# Patient Record
Sex: FEMALE | Race: WHITE | Employment: UNEMPLOYED | URBAN - METROPOLITAN AREA
[De-identification: names, ages, dates, MRNs, and addresses within clinical notes are randomized per-mention and may not be internally consistent; named-entity substitution may affect disease eponyms.]

---

## 2022-10-25 LAB
ABO, EXTERNAL RESULT: NORMAL
HEP B, EXTERNAL RESULT: NORMAL
HEPATITIS C ANTIBODY, EXTERNAL RESULT: NORMAL
HIV, EXTERNAL RESULT: NORMAL
RH FACTOR, EXTERNAL RESULT: NORMAL
RUBELLA TITER, EXTERNAL RESULT: NORMAL

## 2023-04-27 LAB
C. TRACHOMATIS, EXTERNAL RESULT: NORMAL
N. GONORRHOEAE, EXTERNAL RESULT: NORMAL

## 2023-05-02 LAB — GBS, EXTERNAL RESULT: NORMAL

## 2023-05-23 ENCOUNTER — HOSPITAL ENCOUNTER (INPATIENT)
Facility: HOSPITAL | Age: 33
LOS: 4 days | Discharge: HOME | DRG: 540 | End: 2023-05-27
Attending: SPECIALIST | Admitting: MIDWIFE
Payer: MEDICAID

## 2023-05-23 DIAGNOSIS — G89.18 POST-OP PAIN: Primary | ICD-10-CM

## 2023-05-23 PROBLEM — Z34.80 PREGNANCY IN MULTIGRAVIDA: Status: ACTIVE | Noted: 2023-05-23

## 2023-05-23 LAB
AMPHET UR QL SCN: NEGATIVE
BARBITURATES UR QL SCN: NEGATIVE
BASOPHILS # BLD: 0 K/UL (ref 0–0.1)
BASOPHILS NFR BLD: 0 % (ref 0–1)
BENZODIAZ UR QL: NEGATIVE
CANNABINOIDS UR QL SCN: NEGATIVE
COCAINE UR QL SCN: NEGATIVE
DIFFERENTIAL METHOD BLD: NORMAL
EOSINOPHIL # BLD: 0.1 K/UL (ref 0–0.4)
EOSINOPHIL NFR BLD: 1 % (ref 0–7)
ERYTHROCYTE [DISTWIDTH] IN BLOOD BY AUTOMATED COUNT: 13.7 % (ref 11.5–14.5)
HCT VFR BLD AUTO: 36.7 % (ref 35–47)
HGB BLD-MCNC: 12.3 G/DL (ref 11.5–16)
IMM GRANULOCYTES # BLD AUTO: 0 K/UL (ref 0–0.04)
IMM GRANULOCYTES NFR BLD AUTO: 0 % (ref 0–0.5)
LYMPHOCYTES # BLD: 1.5 K/UL (ref 0.8–3.5)
LYMPHOCYTES NFR BLD: 17 % (ref 12–49)
Lab: NORMAL
MCH RBC QN AUTO: 30.9 PG (ref 26–34)
MCHC RBC AUTO-ENTMCNC: 33.5 G/DL (ref 30–36.5)
MCV RBC AUTO: 92.2 FL (ref 80–99)
METHADONE UR QL: NEGATIVE
MONOCYTES # BLD: 0.7 K/UL (ref 0–1)
MONOCYTES NFR BLD: 8 % (ref 5–13)
NEUTS SEG # BLD: 6.6 K/UL (ref 1.8–8)
NEUTS SEG NFR BLD: 74 % (ref 32–75)
NRBC # BLD: 0 K/UL (ref 0–0.01)
NRBC BLD-RTO: 0 PER 100 WBC
OPIATES UR QL: NEGATIVE
PCP UR QL: NEGATIVE
PLATELET # BLD AUTO: 187 K/UL (ref 150–400)
PMV BLD AUTO: 12.6 FL (ref 8.9–12.9)
RBC # BLD AUTO: 3.98 M/UL (ref 3.8–5.2)
WBC # BLD AUTO: 9 K/UL (ref 3.6–11)

## 2023-05-23 PROCEDURE — 1120000000 HC RM PRIVATE OB

## 2023-05-23 PROCEDURE — 6370000000 HC RX 637 (ALT 250 FOR IP): Performed by: MIDWIFE

## 2023-05-23 PROCEDURE — 7210000100 HC LABOR FEE PER 1 HR

## 2023-05-23 PROCEDURE — 86850 RBC ANTIBODY SCREEN: CPT

## 2023-05-23 PROCEDURE — 86900 BLOOD TYPING SEROLOGIC ABO: CPT

## 2023-05-23 PROCEDURE — 86901 BLOOD TYPING SEROLOGIC RH(D): CPT

## 2023-05-23 PROCEDURE — 80307 DRUG TEST PRSMV CHEM ANLYZR: CPT

## 2023-05-23 PROCEDURE — 85025 COMPLETE CBC W/AUTO DIFF WBC: CPT

## 2023-05-23 PROCEDURE — 36415 COLL VENOUS BLD VENIPUNCTURE: CPT

## 2023-05-23 PROCEDURE — 59200 INSERT CERVICAL DILATOR: CPT

## 2023-05-23 RX ORDER — SODIUM CHLORIDE, SODIUM LACTATE, POTASSIUM CHLORIDE, CALCIUM CHLORIDE 600; 310; 30; 20 MG/100ML; MG/100ML; MG/100ML; MG/100ML
INJECTION, SOLUTION INTRAVENOUS CONTINUOUS
Status: DISCONTINUED | OUTPATIENT
Start: 2023-05-24 | End: 2023-05-26

## 2023-05-23 RX ORDER — ONDANSETRON 2 MG/ML
4 INJECTION INTRAMUSCULAR; INTRAVENOUS EVERY 6 HOURS PRN
Status: DISCONTINUED | OUTPATIENT
Start: 2023-05-23 | End: 2023-05-26

## 2023-05-23 RX ORDER — NALBUPHINE HCL 10 MG/ML
10 AMPUL (ML) INJECTION
Status: DISCONTINUED | OUTPATIENT
Start: 2023-05-23 | End: 2023-05-26

## 2023-05-23 RX ORDER — SODIUM CHLORIDE 0.9 % (FLUSH) 0.9 %
5-40 SYRINGE (ML) INJECTION EVERY 12 HOURS SCHEDULED
Status: DISCONTINUED | OUTPATIENT
Start: 2023-05-23 | End: 2023-05-26

## 2023-05-23 RX ORDER — MINERAL OIL 471.99 G/472ML
2.5 OIL TOPICAL
Status: DISPENSED | OUTPATIENT
Start: 2023-05-24 | End: 2023-05-25

## 2023-05-23 RX ORDER — TERBUTALINE SULFATE 1 MG/ML
0.25 INJECTION, SOLUTION SUBCUTANEOUS
Status: ACTIVE | OUTPATIENT
Start: 2023-05-23 | End: 2023-05-24

## 2023-05-23 RX ORDER — SODIUM CHLORIDE 9 MG/ML
25 INJECTION, SOLUTION INTRAVENOUS PRN
Status: DISCONTINUED | OUTPATIENT
Start: 2023-05-23 | End: 2023-05-26

## 2023-05-23 RX ORDER — SODIUM CHLORIDE, SODIUM LACTATE, POTASSIUM CHLORIDE, AND CALCIUM CHLORIDE .6; .31; .03; .02 G/100ML; G/100ML; G/100ML; G/100ML
1000 INJECTION, SOLUTION INTRAVENOUS PRN
Status: DISCONTINUED | OUTPATIENT
Start: 2023-05-23 | End: 2023-05-26

## 2023-05-23 RX ORDER — ZOLPIDEM TARTRATE 5 MG/1
5 TABLET ORAL NIGHTLY PRN
Status: DISCONTINUED | OUTPATIENT
Start: 2023-05-23 | End: 2023-05-27 | Stop reason: HOSPADM

## 2023-05-23 RX ORDER — ACETAMINOPHEN 325 MG/1
650 TABLET ORAL EVERY 4 HOURS PRN
Status: DISCONTINUED | OUTPATIENT
Start: 2023-05-23 | End: 2023-05-27 | Stop reason: HOSPADM

## 2023-05-23 RX ORDER — SODIUM CHLORIDE, SODIUM LACTATE, POTASSIUM CHLORIDE, AND CALCIUM CHLORIDE .6; .31; .03; .02 G/100ML; G/100ML; G/100ML; G/100ML
500 INJECTION, SOLUTION INTRAVENOUS PRN
Status: DISCONTINUED | OUTPATIENT
Start: 2023-05-23 | End: 2023-05-26

## 2023-05-23 RX ORDER — SODIUM CHLORIDE 0.9 % (FLUSH) 0.9 %
5-40 SYRINGE (ML) INJECTION PRN
Status: DISCONTINUED | OUTPATIENT
Start: 2023-05-23 | End: 2023-05-26

## 2023-05-23 RX ADMIN — ACETAMINOPHEN 650 MG: 325 TABLET ORAL at 19:55

## 2023-05-23 ASSESSMENT — PAIN DESCRIPTION - DESCRIPTORS: DESCRIPTORS: ACHING

## 2023-05-23 ASSESSMENT — PAIN SCALES - GENERAL: PAINLEVEL_OUTOF10: 3

## 2023-05-23 ASSESSMENT — PAIN DESCRIPTION - LOCATION: LOCATION: ABDOMEN

## 2023-05-23 NOTE — PROGRESS NOTES
1720- SVE completed with reyes balloon with 60 ml to vaginal and uterine balloon, orders received that pt could have NST at this time and then to have FHR tones in evening shift and or PRN and then to begin pitocin at 0600 in am by SALLY Guillen. 1930- SBAR to SALLY Obregon RN

## 2023-05-24 ENCOUNTER — ANESTHESIA EVENT (OUTPATIENT)
Dept: LABOR AND DELIVERY | Facility: HOSPITAL | Age: 33
End: 2023-05-24
Payer: MEDICAID

## 2023-05-24 ENCOUNTER — ANESTHESIA (OUTPATIENT)
Dept: LABOR AND DELIVERY | Facility: HOSPITAL | Age: 33
End: 2023-05-24
Payer: MEDICAID

## 2023-05-24 PROCEDURE — 2500000003 HC RX 250 WO HCPCS

## 2023-05-24 PROCEDURE — 2500000003 HC RX 250 WO HCPCS: Performed by: ANESTHESIOLOGY

## 2023-05-24 PROCEDURE — 1120000000 HC RM PRIVATE OB

## 2023-05-24 PROCEDURE — 10907ZC DRAINAGE OF AMNIOTIC FLUID, THERAPEUTIC FROM PRODUCTS OF CONCEPTION, VIA NATURAL OR ARTIFICIAL OPENING: ICD-10-PCS | Performed by: SPECIALIST

## 2023-05-24 PROCEDURE — 6360000002 HC RX W HCPCS: Performed by: ANESTHESIOLOGY

## 2023-05-24 PROCEDURE — 10H07YZ INSERTION OF OTHER DEVICE INTO PRODUCTS OF CONCEPTION, VIA NATURAL OR ARTIFICIAL OPENING: ICD-10-PCS | Performed by: SPECIALIST

## 2023-05-24 PROCEDURE — 2500000003 HC RX 250 WO HCPCS: Performed by: NURSE ANESTHETIST, CERTIFIED REGISTERED

## 2023-05-24 PROCEDURE — 3700000000 HC ANESTHESIA ATTENDED CARE: Performed by: SPECIALIST

## 2023-05-24 PROCEDURE — 6360000002 HC RX W HCPCS: Performed by: MIDWIFE

## 2023-05-24 PROCEDURE — 3609079900 HC CESAREAN SECTION: Performed by: SPECIALIST

## 2023-05-24 PROCEDURE — 3700000001 HC ADD 15 MINUTES (ANESTHESIA): Performed by: SPECIALIST

## 2023-05-24 PROCEDURE — 2580000003 HC RX 258: Performed by: MIDWIFE

## 2023-05-24 PROCEDURE — 6360000002 HC RX W HCPCS: Performed by: NURSE ANESTHETIST, CERTIFIED REGISTERED

## 2023-05-24 PROCEDURE — 7210000100 HC LABOR FEE PER 1 HR

## 2023-05-24 PROCEDURE — 2709999900 HC NON-CHARGEABLE SUPPLY: Performed by: SPECIALIST

## 2023-05-24 PROCEDURE — 7100000001 HC PACU RECOVERY - ADDTL 15 MIN

## 2023-05-24 PROCEDURE — 2500000003 HC RX 250 WO HCPCS: Performed by: OBSTETRICS & GYNECOLOGY

## 2023-05-24 PROCEDURE — 00HU33Z INSERTION OF INFUSION DEVICE INTO SPINAL CANAL, PERCUTANEOUS APPROACH: ICD-10-PCS | Performed by: SPECIALIST

## 2023-05-24 PROCEDURE — 2580000003 HC RX 258: Performed by: NURSE ANESTHETIST, CERTIFIED REGISTERED

## 2023-05-24 PROCEDURE — 3700000156 HC EPIDURAL ANESTHESIA

## 2023-05-24 PROCEDURE — 2580000003 HC RX 258: Performed by: OBSTETRICS & GYNECOLOGY

## 2023-05-24 RX ORDER — FENTANYL 0.2 MG/100ML-BUPIV 0.125%-NACL 0.9% EPIDURAL INJ 2/0.125 MCG/ML-%
SOLUTION INJECTION
Status: COMPLETED
Start: 2023-05-24 | End: 2023-05-24

## 2023-05-24 RX ORDER — ONDANSETRON 2 MG/ML
INJECTION INTRAMUSCULAR; INTRAVENOUS PRN
Status: DISCONTINUED | OUTPATIENT
Start: 2023-05-24 | End: 2023-05-24 | Stop reason: SDUPTHER

## 2023-05-24 RX ORDER — NALOXONE HYDROCHLORIDE 0.4 MG/ML
INJECTION, SOLUTION INTRAMUSCULAR; INTRAVENOUS; SUBCUTANEOUS PRN
Status: DISCONTINUED | OUTPATIENT
Start: 2023-05-24 | End: 2023-05-26

## 2023-05-24 RX ORDER — SODIUM CHLORIDE 9 MG/ML
INJECTION, SOLUTION INTRAVENOUS PRN
Status: DISCONTINUED | OUTPATIENT
Start: 2023-05-24 | End: 2023-05-27 | Stop reason: HOSPADM

## 2023-05-24 RX ORDER — SODIUM CHLORIDE 0.9 % (FLUSH) 0.9 %
5-40 SYRINGE (ML) INJECTION PRN
Status: DISCONTINUED | OUTPATIENT
Start: 2023-05-24 | End: 2023-05-27 | Stop reason: HOSPADM

## 2023-05-24 RX ORDER — LIDOCAINE HYDROCHLORIDE AND EPINEPHRINE 20; 5 MG/ML; UG/ML
INJECTION, SOLUTION EPIDURAL; INFILTRATION; INTRACAUDAL; PERINEURAL PRN
Status: DISCONTINUED | OUTPATIENT
Start: 2023-05-24 | End: 2023-05-24 | Stop reason: SDUPTHER

## 2023-05-24 RX ORDER — KETOROLAC TROMETHAMINE 30 MG/ML
INJECTION, SOLUTION INTRAMUSCULAR; INTRAVENOUS PRN
Status: DISCONTINUED | OUTPATIENT
Start: 2023-05-24 | End: 2023-05-24 | Stop reason: SDUPTHER

## 2023-05-24 RX ORDER — DEXAMETHASONE SODIUM PHOSPHATE 4 MG/ML
INJECTION, SOLUTION INTRA-ARTICULAR; INTRALESIONAL; INTRAMUSCULAR; INTRAVENOUS; SOFT TISSUE PRN
Status: DISCONTINUED | OUTPATIENT
Start: 2023-05-24 | End: 2023-05-24 | Stop reason: SDUPTHER

## 2023-05-24 RX ORDER — FENTANYL CITRATE 50 UG/ML
INJECTION, SOLUTION INTRAMUSCULAR; INTRAVENOUS PRN
Status: DISCONTINUED | OUTPATIENT
Start: 2023-05-24 | End: 2023-05-24 | Stop reason: SDUPTHER

## 2023-05-24 RX ORDER — FENTANYL CITRATE 50 UG/ML
INJECTION, SOLUTION INTRAMUSCULAR; INTRAVENOUS
Status: DISPENSED
Start: 2023-05-24 | End: 2023-05-24

## 2023-05-24 RX ORDER — FENTANYL 0.2 MG/100ML-BUPIV 0.125%-NACL 0.9% EPIDURAL INJ 2/0.125 MCG/ML-%
12 SOLUTION INJECTION CONTINUOUS
Status: DISCONTINUED | OUTPATIENT
Start: 2023-05-24 | End: 2023-05-26

## 2023-05-24 RX ORDER — BUPIVACAINE HYDROCHLORIDE AND EPINEPHRINE 2.5; 5 MG/ML; UG/ML
INJECTION, SOLUTION EPIDURAL; INFILTRATION; INTRACAUDAL; PERINEURAL
Status: COMPLETED
Start: 2023-05-24 | End: 2023-05-24

## 2023-05-24 RX ORDER — DIPHENHYDRAMINE HYDROCHLORIDE 50 MG/ML
50 INJECTION INTRAMUSCULAR; INTRAVENOUS
Status: COMPLETED | OUTPATIENT
Start: 2023-05-24 | End: 2023-05-24

## 2023-05-24 RX ORDER — BUPIVACAINE HYDROCHLORIDE AND EPINEPHRINE 2.5; 5 MG/ML; UG/ML
INJECTION, SOLUTION EPIDURAL; INFILTRATION; INTRACAUDAL; PERINEURAL PRN
Status: DISCONTINUED | OUTPATIENT
Start: 2023-05-24 | End: 2023-05-24 | Stop reason: SDUPTHER

## 2023-05-24 RX ORDER — MORPHINE SULFATE 1 MG/ML
INJECTION, SOLUTION EPIDURAL; INTRATHECAL; INTRAVENOUS PRN
Status: DISCONTINUED | OUTPATIENT
Start: 2023-05-24 | End: 2023-05-24 | Stop reason: SDUPTHER

## 2023-05-24 RX ORDER — BUPIVACAINE HYDROCHLORIDE AND EPINEPHRINE 5; 5 MG/ML; UG/ML
INJECTION, SOLUTION EPIDURAL; INTRACAUDAL; PERINEURAL
Status: DISPENSED
Start: 2023-05-24 | End: 2023-05-24

## 2023-05-24 RX ORDER — ONDANSETRON 2 MG/ML
4 INJECTION INTRAMUSCULAR; INTRAVENOUS EVERY 6 HOURS PRN
Status: DISCONTINUED | OUTPATIENT
Start: 2023-05-24 | End: 2023-05-24

## 2023-05-24 RX ORDER — SODIUM CHLORIDE, SODIUM LACTATE, POTASSIUM CHLORIDE, CALCIUM CHLORIDE 600; 310; 30; 20 MG/100ML; MG/100ML; MG/100ML; MG/100ML
INJECTION, SOLUTION INTRAVENOUS CONTINUOUS
Status: DISCONTINUED | OUTPATIENT
Start: 2023-05-24 | End: 2023-05-27 | Stop reason: HOSPADM

## 2023-05-24 RX ORDER — LANOLIN/MINERAL OIL
LOTION (ML) TOPICAL
Status: DISCONTINUED | OUTPATIENT
Start: 2023-05-24 | End: 2023-05-27 | Stop reason: HOSPADM

## 2023-05-24 RX ORDER — IBUPROFEN 600 MG/1
600 TABLET ORAL EVERY 8 HOURS SCHEDULED
Status: DISCONTINUED | OUTPATIENT
Start: 2023-05-24 | End: 2023-05-27 | Stop reason: HOSPADM

## 2023-05-24 RX ORDER — SODIUM CHLORIDE, SODIUM LACTATE, POTASSIUM CHLORIDE, CALCIUM CHLORIDE 600; 310; 30; 20 MG/100ML; MG/100ML; MG/100ML; MG/100ML
INJECTION, SOLUTION INTRAVENOUS CONTINUOUS PRN
Status: DISCONTINUED | OUTPATIENT
Start: 2023-05-24 | End: 2023-05-24 | Stop reason: SDUPTHER

## 2023-05-24 RX ORDER — DOCUSATE SODIUM 100 MG/1
100 CAPSULE, LIQUID FILLED ORAL 2 TIMES DAILY
Status: DISCONTINUED | OUTPATIENT
Start: 2023-05-24 | End: 2023-05-27 | Stop reason: HOSPADM

## 2023-05-24 RX ORDER — FENTANYL 0.2 MG/100ML-BUPIV 0.125%-NACL 0.9% EPIDURAL INJ 2/0.125 MCG/ML-%
12 SOLUTION INJECTION CONTINUOUS
Status: DISCONTINUED | OUTPATIENT
Start: 2023-05-24 | End: 2023-05-24

## 2023-05-24 RX ORDER — ONDANSETRON 2 MG/ML
4 INJECTION INTRAMUSCULAR; INTRAVENOUS EVERY 6 HOURS PRN
Status: DISCONTINUED | OUTPATIENT
Start: 2023-05-24 | End: 2023-05-26

## 2023-05-24 RX ORDER — OXYCODONE HYDROCHLORIDE 5 MG/1
5 TABLET ORAL EVERY 4 HOURS PRN
Status: DISCONTINUED | OUTPATIENT
Start: 2023-05-24 | End: 2023-05-27 | Stop reason: HOSPADM

## 2023-05-24 RX ORDER — PHENYLEPHRINE HCL IN 0.9% NACL 0.4MG/10ML
SYRINGE (ML) INTRAVENOUS PRN
Status: DISCONTINUED | OUTPATIENT
Start: 2023-05-24 | End: 2023-05-24 | Stop reason: SDUPTHER

## 2023-05-24 RX ORDER — NALOXONE HYDROCHLORIDE 0.4 MG/ML
INJECTION, SOLUTION INTRAMUSCULAR; INTRAVENOUS; SUBCUTANEOUS PRN
Status: DISCONTINUED | OUTPATIENT
Start: 2023-05-24 | End: 2023-05-24

## 2023-05-24 RX ORDER — KETOROLAC TROMETHAMINE 30 MG/ML
30 INJECTION, SOLUTION INTRAMUSCULAR; INTRAVENOUS EVERY 6 HOURS
Status: DISPENSED | OUTPATIENT
Start: 2023-05-24 | End: 2023-05-25

## 2023-05-24 RX ORDER — SODIUM CHLORIDE 0.9 % (FLUSH) 0.9 %
5-40 SYRINGE (ML) INJECTION EVERY 12 HOURS SCHEDULED
Status: DISCONTINUED | OUTPATIENT
Start: 2023-05-24 | End: 2023-05-27 | Stop reason: HOSPADM

## 2023-05-24 RX ADMIN — Medication 12 ML/HR: at 12:28

## 2023-05-24 RX ADMIN — Medication 12 ML/HR: at 19:09

## 2023-05-24 RX ADMIN — ONDANSETRON 4 MG: 2 INJECTION INTRAMUSCULAR; INTRAVENOUS at 16:39

## 2023-05-24 RX ADMIN — LIDOCAINE HYDROCHLORIDE,EPINEPHRINE BITARTRATE 5 ML: 20; .005 INJECTION, SOLUTION EPIDURAL; INFILTRATION; INTRACAUDAL; PERINEURAL at 20:50

## 2023-05-24 RX ADMIN — DEXAMETHASONE SODIUM PHOSPHATE 4 MG: 4 INJECTION, SOLUTION INTRAMUSCULAR; INTRAVENOUS at 20:57

## 2023-05-24 RX ADMIN — BUPIVACAINE HYDROCHLORIDE AND EPINEPHRINE 3 ML: 2.5; 5 INJECTION, SOLUTION EPIDURAL; INFILTRATION; INTRACAUDAL; PERINEURAL at 11:51

## 2023-05-24 RX ADMIN — MORPHINE SULFATE 3 MG: 1 INJECTION, SOLUTION EPIDURAL; INTRATHECAL; INTRAVENOUS at 21:10

## 2023-05-24 RX ADMIN — OXYTOCIN 1 MILLI-UNITS/MIN: 10 INJECTION INTRAVENOUS at 08:20

## 2023-05-24 RX ADMIN — Medication 166.7 ML: at 21:49

## 2023-05-24 RX ADMIN — AZITHROMYCIN MONOHYDRATE 500 MG: 500 INJECTION, POWDER, LYOPHILIZED, FOR SOLUTION INTRAVENOUS at 20:32

## 2023-05-24 RX ADMIN — SODIUM CHLORIDE, POTASSIUM CHLORIDE, SODIUM LACTATE AND CALCIUM CHLORIDE 500 ML: 600; 310; 30; 20 INJECTION, SOLUTION INTRAVENOUS at 21:49

## 2023-05-24 RX ADMIN — Medication 80 MCG: at 21:09

## 2023-05-24 RX ADMIN — ONDANSETRON HYDROCHLORIDE 4 MG: 2 INJECTION, SOLUTION INTRAMUSCULAR; INTRAVENOUS at 20:54

## 2023-05-24 RX ADMIN — SODIUM CHLORIDE, POTASSIUM CHLORIDE, SODIUM LACTATE AND CALCIUM CHLORIDE: 600; 310; 30; 20 INJECTION, SOLUTION INTRAVENOUS at 08:55

## 2023-05-24 RX ADMIN — Medication 80 MCG: at 20:59

## 2023-05-24 RX ADMIN — FENTANYL CITRATE 100 MCG: 50 INJECTION, SOLUTION INTRAMUSCULAR; INTRAVENOUS at 11:52

## 2023-05-24 RX ADMIN — LIDOCAINE HYDROCHLORIDE,EPINEPHRINE BITARTRATE 5 ML: 20; .005 INJECTION, SOLUTION EPIDURAL; INFILTRATION; INTRACAUDAL; PERINEURAL at 20:47

## 2023-05-24 RX ADMIN — KETOROLAC TROMETHAMINE 15 MG: 30 INJECTION, SOLUTION INTRAMUSCULAR; INTRAVENOUS at 21:15

## 2023-05-24 RX ADMIN — BUPIVACAINE HYDROCHLORIDE AND EPINEPHRINE 3 ML: 2.5; 5 INJECTION, SOLUTION EPIDURAL; INFILTRATION; INTRACAUDAL; PERINEURAL at 11:52

## 2023-05-24 RX ADMIN — DIPHENHYDRAMINE HYDROCHLORIDE 50 MG: 50 INJECTION, SOLUTION INTRAMUSCULAR; INTRAVENOUS at 20:01

## 2023-05-24 RX ADMIN — Medication 120 MCG: at 21:02

## 2023-05-24 RX ADMIN — LIDOCAINE HYDROCHLORIDE,EPINEPHRINE BITARTRATE 2 ML: 20; .005 INJECTION, SOLUTION EPIDURAL; INFILTRATION; INTRACAUDAL; PERINEURAL at 21:10

## 2023-05-24 RX ADMIN — CEFAZOLIN 2000 MG: 1 INJECTION, POWDER, FOR SOLUTION INTRAMUSCULAR; INTRAVENOUS at 20:31

## 2023-05-24 RX ADMIN — BUPIVACAINE HYDROCHLORIDE AND EPINEPHRINE 0.5 ML: 2.5; 5 INJECTION, SOLUTION EPIDURAL; INFILTRATION; INTRACAUDAL; PERINEURAL at 11:50

## 2023-05-24 RX ADMIN — SODIUM CHLORIDE, POTASSIUM CHLORIDE, SODIUM LACTATE AND CALCIUM CHLORIDE: 600; 310; 30; 20 INJECTION, SOLUTION INTRAVENOUS at 20:42

## 2023-05-24 RX ADMIN — TRANEXAMIC ACID 1000 MG: 100 INJECTION, SOLUTION INTRAVENOUS at 22:03

## 2023-05-24 RX ADMIN — LIDOCAINE HYDROCHLORIDE,EPINEPHRINE BITARTRATE 5 ML: 20; .005 INJECTION, SOLUTION EPIDURAL; INFILTRATION; INTRACAUDAL; PERINEURAL at 20:42

## 2023-05-24 NOTE — PROGRESS NOTES
CNM Labor Progress Note     Patient: Triny Hastings MRN: [id number]  SSN: [id number]    YOB: 1990  Age: 27 y.o. Sex: female        Subjective:   Pt comfortable w/ epidural placement. No concerns @ this time. Objective:   Patient Vitals for the past 4 hrs:   Temp Pulse BP SpO2   23 1134 -- 96 (!) 140/82 98 %   23 1129 -- 97 -- 99 %   23 1119 -- 90 138/80 98 %   23 1114 -- 98 -- 98 %   23 1049 -- 89 134/86 --   23 1034 -- 93 131/88 --   23 1019 -- 93 123/79 --   23 1005 -- 98 116/88 --   23 0951 -- -- -- 98 %   23 0949 -- (!) 107 118/88 --   23 0934 -- 100 118/85 --   23 0931 99 °F (37.2 °C) 100 -- 97 %       Cervical Exam: 6 cm dilated    60% effaced    -2 station    Presenting Part: cephalic  Fetal Heart Rate: Reactive  Baseline: 135 per minute  Variability: moderate  Accelerations: yes  Decelerations: none  Uterine contractions: regular, every 2-3 minutes, 80-90 sec, mod to palp, resting  tone soft.       Assessment:   Intrauterine pregnancy at term, elective IOL  Category 1 fetal heart rate tracing         Plan:   Continue current orders/management   CNM management   Anticipate ROXANA Lane, [place]

## 2023-05-24 NOTE — PROGRESS NOTES
1930. Bedside shift change report given to SALLY Coley RN (oncoming nurse) by COLLIN Cheng RN (offgoing nurse). Report included the following information Nurse Handoff Report. 0715. Bedside shift change report given to SALLY Briseno RN (oncoming nurse) by SALLY Coley RN (offgoing nurse). Report included the following information Nurse Handoff Report.

## 2023-05-24 NOTE — PROGRESS NOTES
CNM Labor Progress Note     Patient: Triny Hastings MRN: [id number]  SSN: [id number]    YOB: 1990  Age: 27 y.o. Sex: female        Subjective:   Pt without distress. Reyes bulb still in place, pt OOB after shower. Tension on reyes bulb - removed easily. Objective:   Patient Vitals for the past 4 hrs:   Temp Pulse Resp BP SpO2   23 0717 98.9 °F (37.2 °C) 93 16 (!) 135/90 98 %       Cervical Exam: 5 cm dilated    60% effaced    -2 station    Presenting Part: cephalic  Cervical Position: mid position  Membranes:  Artificial Rupture of Membranes; Amniotic Fluid: small amount of clear fluid  Fetal Heart Rate: Reactive  Baseline: 135 per minute  Variability: moderate  Accelerations: yes  Decelerations: none  Uterine contractions: irregular      Assessment:   Intrauterine pregnancy at term, elective IOL  Category 1 fetal heart rate tracing         Plan:   Continue current orders/management   AROM w/ pt verbal consent  Initiate oxytocin titration  Epidural as desired.   CNM management   Anticipate     Julianna Lane, [place]

## 2023-05-24 NOTE — ANESTHESIA PRE PROCEDURE
Department of Anesthesiology  Preprocedure Note       Name:  Richard Perkins   Age:  27 y.o.  :  1990                                          MRN:  [id number]         Date:  2023      Surgeon: * No surgeons listed *    Procedure: * No procedures listed *    Medications prior to admission:   Prior to Admission medications    Medication Sig Start Date End Date Taking?  Authorizing Provider   [id number] PO Take by mouth   Yes Historical Provider, MD       Current medications:    Current Facility-Administered Medications   Medication Dose Route Frequency Provider Last Rate Last Admin    fentaNYL 2 mcg/mL bupivacaine 0.125% in sodium chloride 0.9% 100 mL 0.2-0.125-0.9 MG/100ML-% epidural infusion             bupivacaine-EPINEPHrine PF (MARCAINE-w/EPINEPHRINE) 0.5% -1:[id number] injection             mineral oil light 100 % liquid 2.5 mL  2.5 mL Topical Once PRN Kreg Neer, APRN - CNM        zolpidem (AMBIEN) tablet 5 mg  5 mg Oral Nightly PRN Kreg Neer, APRN - CNM        lactated ringers IV soln infusion   IntraVENous Continuous Kreg Neer, APRN -  mL/hr at 23 1130 Rate Change at 23 1130    lactated ringers bolus  500 mL IntraVENous PRN Kreg Neer, APRN - CNM        Or    lactated ringers bolus  1,000 mL IntraVENous PRN Kreg Neer, APRN - CNM        sodium chloride flush 0.9 % injection 5-40 mL  5-40 mL IntraVENous 2 times per day Kreg Neer, APRN - CNM        sodium chloride flush 0.9 % injection 5-40 mL  5-40 mL IntraVENous PRN Kreg Neer, APRN - CNM        0.9 % sodium chloride infusion  25 mL IntraVENous PRN Kreg Neer, APRN - CNM        ondansetron [facility]) injection 4 mg  4 mg IntraVENous Q6H PRN Kreg Neer, APRN - CNM        oxytocin (PITOCIN) 30 units in 500 mL infusion  87.3 oleksandr-units/min IntraVENous Continuous PRN Kreg Neer, APRN - CNM        And   [employer]
3             Anesthetic plan and risks discussed with patient.         Attending anesthesiologist reviewed and agrees with Preprocedure content      Post-op pain plan if not by surgeon: continuous epidural            Simon Galeano MD   5/24/2023

## 2023-05-24 NOTE — PROGRESS NOTES
CNM Labor Progress Note     Patient: Richard Perkins MRN: [id number]  SSN: [id number]    YOB: 1990  Age: 27 y.o.   Sex: female        Subjective:   Pt comfortable w/ epidural.         Objective:   Patient Vitals for the past 4 hrs:   Temp Pulse Resp BP SpO2   23 1400 -- 92 -- 128/67 97 %   23 1345 98.2 °F (36.8 °C) 90 14 128/72 99 %   23 1330 -- 94 -- 127/81 95 %   23 1315 -- 94 -- 120/64 99 %   23 1300 -- 87 -- 132/76 92 %   23 1245 -- 92 -- (!) 130/56 98 %   23 1230 -- 86 -- 128/76 99 %       Cervical Exam: 8 cm dilated    80% effaced    -1 station    Fetal Heart Rate: Reactive  Baseline: 150 per minute  Variability: moderate  Accelerations: yes  Decelerations: none  Uterine contractions: regular, every 2-4 minutes      Assessment:   Intrauterine pregnancy at term, elective IOL  Category 1 fetal heart rate tracing         Plan:   Continue current orders/management   IUPC placed without difficulty  CNM management   Anticipate     Jens Hargrove, [place]

## 2023-05-24 NOTE — H&P
sounds, gravid  Fundus: non tender  Perineum: blood absent, amniotic fluid absent  Cervical Exam: 2 cm dilated    50% effaced    -2 station    Presenting Part: cephalic  Cervical Position: mid position  Consistency: Medium  Burton Score: 5  Lower Extremities:  - Edema 1+   - No evidence of DVT seen on physical exam.  No cords or calf tenderness. No significant calf/ankle edema. Membranes:  Intact  Fetal Heart Rate: Reactive  Baseline: 145 per minute  Variability: moderate  Accelerations: yes  Decelerations: none  Uterine contractions: irregular, every 6 minutes, mild, resting tone soft    Prenatal Labs:   Lab Results   Component Value Date/Time    ABORH A POSITIVE 05/23/2023 05:32 PM         Assessment/Plan:     Principal Problem:    Pregnancy in multigravida  Resolved Problems:    * No resolved hospital problems. *   Term elective IOL  Cat 1 FHR tracing    Plan: Admit for Reassuring fetal status, Continue plan for vaginal delivery, Cook catheter placed without difficulty, 60 ml NS instilled intrauterine and vaginal .  Group B Strep was negative. NST prior to bed, able to come off of monitor overnight. Plan for AROM / oxytocin in the morning. Epidural as desired  POC discussed with pt and partner - in agreement. Plan for vaginal delivery.

## 2023-05-24 NOTE — ANESTHESIA PROCEDURE NOTES
CSE Block    Patient location during procedure: OB  Start time: 5/24/2023 11:44 AM  End time: 5/24/2023 11:54 AM  Reason for block: labor epidural  Staffing  Performed: anesthesiologist   CSE  Patient position: sitting  Prep: ChloraPrep  Patient monitoring: continuous pulse ox and frequent blood pressure checks  Approach: midline  Provider prep: mask and sterile gloves  Spinal Needle  Needle type: pencil-tip   Needle gauge: 25 G  Needle length: 6 in  Epidural Needle  Injection technique: RADHA saline  Needle type: Tuohy   Needle gauge: 17 G  Needle length: 6 in  Needle insertion depth: 7 cm  Location: lumbar (1-5)  Catheter  Catheter type: end hole  Catheter size: 19 G  Catheter at skin depth: 11 cm  Test dose: negative  [id number]  Hemodynamics: stable  Preanesthetic Checklist  Completed: patient identified, IV checked, site marked, risks and benefits discussed, surgical/procedural consents, equipment checked, pre-op evaluation, timeout performed, anesthesia consent given, oxygen available, monitors applied/VS acknowledged, fire risk safety assessment completed and verbalized and blood product R/B/A discussed and consented

## 2023-05-25 LAB
ERYTHROCYTE [DISTWIDTH] IN BLOOD BY AUTOMATED COUNT: 14.1 % (ref 11.5–14.5)
HCT VFR BLD AUTO: 32.1 % (ref 35–47)
HGB BLD-MCNC: 10.6 G/DL (ref 11.5–16)
MCH RBC QN AUTO: 30.6 PG (ref 26–34)
MCHC RBC AUTO-ENTMCNC: 33 G/DL (ref 30–36.5)
MCV RBC AUTO: 92.8 FL (ref 80–99)
NRBC # BLD: 0 K/UL (ref 0–0.01)
NRBC BLD-RTO: 0 PER 100 WBC
PLATELET # BLD AUTO: 165 K/UL (ref 150–400)
PMV BLD AUTO: 12 FL (ref 8.9–12.9)
RBC # BLD AUTO: 3.46 M/UL (ref 3.8–5.2)
WBC # BLD AUTO: 17.1 K/UL (ref 3.6–11)

## 2023-05-25 PROCEDURE — 6360000002 HC RX W HCPCS: Performed by: SPECIALIST

## 2023-05-25 PROCEDURE — 85027 COMPLETE CBC AUTOMATED: CPT

## 2023-05-25 PROCEDURE — 6370000000 HC RX 637 (ALT 250 FOR IP): Performed by: MIDWIFE

## 2023-05-25 PROCEDURE — 6370000000 HC RX 637 (ALT 250 FOR IP): Performed by: SPECIALIST

## 2023-05-25 PROCEDURE — 36415 COLL VENOUS BLD VENIPUNCTURE: CPT

## 2023-05-25 PROCEDURE — 1120000000 HC RM PRIVATE OB

## 2023-05-25 RX ADMIN — ACETAMINOPHEN 650 MG: 325 TABLET ORAL at 17:40

## 2023-05-25 RX ADMIN — DOCUSATE SODIUM 100 MG: 100 CAPSULE, LIQUID FILLED ORAL at 20:24

## 2023-05-25 RX ADMIN — IBUPROFEN 600 MG: 600 TABLET ORAL at 20:24

## 2023-05-25 RX ADMIN — DOCUSATE SODIUM 100 MG: 100 CAPSULE, LIQUID FILLED ORAL at 11:39

## 2023-05-25 RX ADMIN — KETOROLAC TROMETHAMINE 30 MG: 30 INJECTION, SOLUTION INTRAMUSCULAR; INTRAVENOUS at 05:14

## 2023-05-25 RX ADMIN — IBUPROFEN 600 MG: 600 TABLET ORAL at 11:39

## 2023-05-25 ASSESSMENT — PAIN DESCRIPTION - DESCRIPTORS
DESCRIPTORS: CRAMPING;DISCOMFORT;ACHING
DESCRIPTORS: ACHING
DESCRIPTORS: ACHING;DULL
DESCRIPTORS: BURNING

## 2023-05-25 ASSESSMENT — PAIN SCALES - GENERAL
PAINLEVEL_OUTOF10: 5
PAINLEVEL_OUTOF10: 5
PAINLEVEL_OUTOF10: 3
PAINLEVEL_OUTOF10: 6

## 2023-05-25 ASSESSMENT — PAIN DESCRIPTION - LOCATION
LOCATION: ABDOMEN
LOCATION: INCISION
LOCATION: ABDOMEN
LOCATION: ABDOMEN;INCISION

## 2023-05-25 ASSESSMENT — PAIN DESCRIPTION - ORIENTATION
ORIENTATION: LOWER

## 2023-05-25 ASSESSMENT — PAIN - FUNCTIONAL ASSESSMENT
PAIN_FUNCTIONAL_ASSESSMENT: ACTIVITIES ARE NOT PREVENTED

## 2023-05-25 NOTE — PROGRESS NOTES
0730: Bedside and Verbal shift change report given to A Harshil Garrett (oncoming nurse) by Thad Peterson RN (offgoing nurse). Report included the following information Nurse Handoff Report, Intake/Output, MAR, and Recent Results. 1532: SVE performed by A Celestino HERNANDEZM; 5/60/-2 and AROM performed with clear fluid at this time; strip reviewed at this time     1142: Anesthesia at bedside at [phone]. Patient positioned to side of the bed, EFM & TOCO adjusted to accommodate sterile field. Difficulty tracing due to maternal position. Time out completed at 1146. Successful epidural is completed by Dr Caio Hebert. Patient is repositioned supine in bed with wedge under left hip. EFM and TOCO replaced and blood pressure frequency increased. Saravia catheter placed and epidural continuous infusion is started. 1230: SVE performed by A Celestino ELKINS; 6/60/-2; strip reviewed at this time     1631: SVE performed by Sonal Duarte; 7/70/-2; strip reviewed at this time     1820: SVE performed by Marli Felicia; 8/80/-1; strip reviewed at this time; pushing started at this time with CNM remaining at bedside     1834: SVE performed by Marlise Felicia; 9/90/0; strip reviewed at this time     1915: Bedside and Verbal shift change report given to A Shahida Rowe RN (oncoming nurse) by Tania Aden RN (offgoing nurse). Report included the following information Nurse Handoff Report, Intake/Output, MAR, and Recent Results.

## 2023-05-25 NOTE — PROGRESS NOTES
1445: Patient assisted to sit on side of bed. Patient wanting to ambulate to bathroom. Unable to get up with assistance this morning. RN and CNA attempted to help patient stand. Patient unable to stand at this time. Will try again later to stand and ambulate. Nhi pad changed. 1735: Catheter care completed.

## 2023-05-25 NOTE — PROGRESS NOTES
Duramorph Follow-Up Note    1 Day Post-Op sp Procedure(s):   SECTION. /71   Pulse 79   Temp 98.3 °F (36.8 °C) (Oral)   Resp 18   Ht 1.6 m (5' 3\")   Wt 103.4 kg (228 lb)   SpO2 96%   Breastfeeding Unknown   BMI 40.39 kg/m² . Patient is POD-1 S/P epidural duramorph. Pain is well controlled  Patient reports no headache, fever, weakness or numbness. Epidural/spinal tap site is clean, dry and intact. No obvious Anesthesia complications noted. Plan:    Pain management as per primary service.

## 2023-05-25 NOTE — PROGRESS NOTES
PN  Pt exhausted  Vss/afeb  Fetal tachycardia, +decels  Cervix 8/edematous/0 station  A/p  section rec due to NRFHT, remote from delivery  Pt agrees  Anesthesia aware

## 2023-05-25 NOTE — PROGRESS NOTES
Post-Operative  Day 1    [address] for the patient's :  Christelle Molina" [[id number]]   , Low Transverse  Patient doing well without significant complaint. Nausea and vomiting resolved, tolerating liquids, no flatus, reyes in place. Vitals:    Vitals:    23 0712   BP: 129/71   Pulse: 79   Resp: 18   Temp: 98.3 °F (36.8 °C)   SpO2: 96%     Temp (24hrs), Av °F (37.2 °C), Min:98.1 °F (36.7 °C), Max:101.8 °F (38.8 °C)         Intake and Output:   Current shift: No intake/output data recorded. Last 3 completed shifts:  190 -  07  In: 909 [I.V.:909]  Out: 1097 [Urine:350]        Exam:        Patient without distress. Lungs clear. Abdomen, bowel sounds present, soft, expected tenderness, fundus firm Wound dressing intact     Perineum normal lochia noted               Lower extremities are negative for swelling, cords or tenderness. Labs:   Lab Results   Component Value Date/Time    WBC 17.1 2023 05:07 AM    WBC 9.0 2023 05:32 PM    HGB 10.6 2023 05:07 AM    HGB 12.3 2023 05:32 PM    HCT 32.1 2023 05:07 AM    HCT 36.7 2023 05:32 PM     2023 05:07 AM     2023 05:32 PM       Recent Results (from the past 24 hour(s))   CBC    Collection Time: 23  5:07 AM   Result Value Ref Range    WBC 17.1 (H) 3.6 - 11.0 K/uL    RBC 3.46 (L) 3.80 - 5.20 M/uL    Hemoglobin 10.6 (L) 11.5 - 16.0 g/dL    Hematocrit 32.1 (L) 35.0 - 47.0 %    MCV 92.8 80.0 - 99.0 FL    MCH 30.6 26.0 - 34.0 PG    MCHC 33.0 30.0 - 36.5 g/dL    RDW 14.1 11.5 - 14.5 %    Platelets 793 445 - 238 K/uL    MPV 12.0 8.9 - 12.9 FL    Nucleated RBCs 0.0 0  WBC    nRBC 0.00 0.00 - 0.01 K/uL       Assessment: Post-Op day 1, stable      Plan:   1. Routine post-operative care   2.  Reyes out today

## 2023-05-25 NOTE — PROGRESS NOTES
Notified of uterine bleeding  Pitocin rate increased, TXA given    Received cytotec intraoperatively    Continue to monitor

## 2023-05-25 NOTE — OP NOTE
The uterine incision was closed using a running interlocking suture of 0 Vicryl. There was some extension at the right angle that was repaired with two figure-of-eight sutures of 0 Vicryl. There was some bleeding at the lower left edge of the uterine incision that was controlled with a figure-of-eight suture of 2-0 Vicryl. The uterus was placed back into the pelvis. Hemostasis was assured. Hemostatic powder was placed over the uterine incision to ensure hemostasis. Attention was turned to the fascia which was closed with a running suture of #1 Vicryl. Subcutaneous layer was irrigated. Bleeders were controlled using the Bovie. The subcutaneous layer was reapproximated using interrupted sutures of 3-0 plain gut. Skin was closed using the Insorb stapler and a sterile dressing was applied. There was clear urine draining from the Saravia at the end of the procedure. Urine was slightly blood-tinged. All sponge, needle and instrument counts were correct x2 at the end of the procedure. The patient tolerated the procedure well, was taken to the recovery area in Labor and Delivery in satisfactory condition.         MD SILVIA [email or web address]/[email or web address]  D:  05/24/2023 21:35  T:  05/25/2023 0:10  JOB #:  [id number]

## 2023-05-25 NOTE — PROGRESS NOTES
0200 pt requesting bottle fed infant to go to the nursery and to be allowed to sleep for 2-3 hours. Pain is controlled, call bell in reach and SO at bedside. No needs expressed at this time   0530 attempted to get patient up with two nurse assist. Pt attempted to stand at the bedside and was unable to hold her own wt. Guided back to bed, linens changed, pericare performed with CHG reyes care also completed. Pt with minimal urine output on this shift, discussed with Dr Agustina Pro which ordered 1l bolus.  Discussed with patient and encouraged her to increase her PO intake as well

## 2023-05-25 NOTE — PROGRESS NOTES
1910. Bedside shift change report given to NATALIYA Crisostomo RN (oncoming nurse) by NATALIYA Briseno RN (offgoing nurse). Report included the following information Nurse Handoff Report.      Almas Zamudio CNM remains at bedside throughout pushing, continues review of strip.     2020. C. Section called at this time. 2057. LTCS of live baby boy by Dr. Natasha Campos. 2335. TRANSFER - OUT REPORT:    Verbal report given to nataliya Rivas rn on Desiree Merrill  being transferred to [facility] for routine progression of patient care       Report consisted of patient's Situation, Background, Assessment and   Recommendations(SBAR). Information from the following report(s) Nurse Handoff Report was reviewed with the receiving nurse. [place] Assessment: No data recorded  Lines:   Peripheral IV 05/23/23 Right Antecubital (Active)   Site Assessment Clean, dry & intact 05/25/23 0045   Line Status Capped 05/25/23 0045   Phlebitis Assessment No symptoms 05/25/23 0045   Infiltration Assessment 0 05/25/23 0045   Alcohol Cap Used No 05/25/23 0045   Dressing Status Clean, dry & intact 05/25/23 0045   Dressing Type Transparent 05/25/23 0045       Peripheral IV 05/23/23 Right Forearm (Active)   Site Assessment Clean, dry & intact 05/25/23 0045   Line Status Infusing 05/25/23 0045   Phlebitis Assessment No symptoms 05/25/23 0045   Infiltration Assessment 0 05/25/23 0045   Alcohol Cap Used No 05/24/23 2138   Dressing Status Clean, dry & intact 05/25/23 0045   Dressing Type Transparent 05/25/23 0045        Opportunity for questions and clarification was provided.       Patient transported with:  Registered Nurse

## 2023-05-26 PROCEDURE — 1120000000 HC RM PRIVATE OB

## 2023-05-26 PROCEDURE — 97530 THERAPEUTIC ACTIVITIES: CPT | Performed by: PHYSICAL THERAPIST

## 2023-05-26 PROCEDURE — 97116 GAIT TRAINING THERAPY: CPT | Performed by: PHYSICAL THERAPIST

## 2023-05-26 PROCEDURE — 6370000000 HC RX 637 (ALT 250 FOR IP): Performed by: MIDWIFE

## 2023-05-26 PROCEDURE — 6370000000 HC RX 637 (ALT 250 FOR IP): Performed by: SPECIALIST

## 2023-05-26 PROCEDURE — 97161 PT EVAL LOW COMPLEX 20 MIN: CPT | Performed by: PHYSICAL THERAPIST

## 2023-05-26 PROCEDURE — 2580000003 HC RX 258: Performed by: SPECIALIST

## 2023-05-26 RX ORDER — IBUPROFEN 600 MG/1
600 TABLET ORAL EVERY 8 HOURS SCHEDULED
Qty: 20 TABLET | Refills: 3 | Status: SHIPPED | OUTPATIENT
Start: 2023-05-26

## 2023-05-26 RX ORDER — OXYCODONE HYDROCHLORIDE 5 MG/1
5 TABLET ORAL EVERY 6 HOURS PRN
Qty: 12 TABLET | Refills: 0 | Status: SHIPPED | OUTPATIENT
Start: 2023-05-26 | End: 2023-05-29

## 2023-05-26 RX ORDER — PSEUDOEPHEDRINE HCL 30 MG
100 TABLET ORAL 2 TIMES DAILY
Qty: 60 CAPSULE | Refills: 1 | Status: SHIPPED | OUTPATIENT
Start: 2023-05-26

## 2023-05-26 RX ADMIN — IBUPROFEN 600 MG: 600 TABLET ORAL at 15:33

## 2023-05-26 RX ADMIN — ACETAMINOPHEN 650 MG: 325 TABLET ORAL at 11:26

## 2023-05-26 RX ADMIN — SODIUM CHLORIDE, PRESERVATIVE FREE 10 ML: 5 INJECTION INTRAVENOUS at 09:53

## 2023-05-26 RX ADMIN — DOCUSATE SODIUM 100 MG: 100 CAPSULE, LIQUID FILLED ORAL at 09:53

## 2023-05-26 RX ADMIN — IBUPROFEN 600 MG: 600 TABLET ORAL at 22:08

## 2023-05-26 RX ADMIN — IBUPROFEN 600 MG: 600 TABLET ORAL at 05:44

## 2023-05-26 ASSESSMENT — PAIN DESCRIPTION - ORIENTATION
ORIENTATION: LOWER
ORIENTATION: LOWER;ANTERIOR
ORIENTATION: LOWER

## 2023-05-26 ASSESSMENT — PAIN DESCRIPTION - DESCRIPTORS
DESCRIPTORS: CRAMPING
DESCRIPTORS: SORE

## 2023-05-26 ASSESSMENT — PAIN DESCRIPTION - LOCATION
LOCATION: ABDOMEN

## 2023-05-26 ASSESSMENT — PAIN SCALES - GENERAL
PAINLEVEL_OUTOF10: 4
PAINLEVEL_OUTOF10: 5
PAINLEVEL_OUTOF10: 4

## 2023-05-26 NOTE — CARE COORDINATION
Transition of Care Plan:    RUR: 4  Barriers: PT clearance    Prior Level of Functioning: Independent    [facility]    3:18 PM   CM was sent to multiple providers. However was declined by most of the [place] agencies. The only agency that can accept is [facility] but d/t staffing they can not start care until 6/5. CM talked to PT today and she indicated that she is going to work with her again tomorrow. Pt may DC over the weekend and CM will need to notify [facility] with DC Summary and DC date. CM can also call Christ Lucas, rep for [address] at [phone]. RW was approved and delivered to Pt room today. 12:42 PM   Therapist called CM and indicated that Pt needs HH PT set up and DME: RW  CM went to verify information with Pt. Pt insurance was listed as BC/BS but Pt indicated she also has a supplemental Kamille. CM made a copy of the insurance cards and is sending email to registration to get that corrected. Offered FOC for [facility]. Pt did not have a preference. CM will send out referrals to providers that cover [place] and accept [employer] Referral sent to: [facility]: pending  [facility]: Pending  [facility]: Pending     DME: RW: Pending Referral sent to [place] [facility]    If SNF or IPR: Date FOC offered:   Date FOC received:   Accepting facility:   Date authorization started with reference number:   Date authorization received and expires: Follow up appointments: PCP: Diogenes Macias:   OB/GYN: Caty/Dayana  Pt wished to schedule her own appt. DME needed: RW: Pending  Transportation at discharge: SILVINO Justice Haver:   IM/IMM Medicare/ letter given: n/a   Is patient a [place] and connected with VA? no   If yes, was [employer] transfer form completed and VA notified?    Caregiver Contact: AVELINA/Boyfriend: Reshma Haver: [phone]  Discharge Caregiver contacted prior to

## 2023-05-26 NOTE — PROGRESS NOTES
Post-Operative Day Number 2 Progress Note    Patient doing well post-op day 2 from  delivery without significant complaints. Pain controlled on current medication. Voiding without difficulty, normal lochia. Vitals:  Patient Vitals for the past 8 hrs:   BP Temp Temp src Pulse Resp SpO2   23 0541 123/82 98.2 °F (36.8 °C) Oral 88 18 97 %   23 0001 110/62 98.2 °F (36.8 °C) Oral 88 20 93 %     Temp (24hrs), Av.4 °F (36.9 °C), Min:98.2 °F (36.8 °C), Max:98.5 °F (36.9 °C)      Vital signs stable, afebrile. Exam:  Patient without distress. Abdomen soft, fundus firm at level of umbilicus, non tender. Incision dry and clean without erythema. Lower extremities weakness and numbness of right shin and left thigh    Lab/Data Review: All lab results for the last 24 hours reviewed.     Assessment and Plan:  Consult PT for further evaluation of weakness and numbness, continue current care

## 2023-05-26 NOTE — PROGRESS NOTES
0571 Verbal shift change report given to SALLY Hernandez RN (oncoming nurse) by Gadiel Maravilla. Pramod Turner RN (offgoing nurse). Report included the following information Nurse Handoff Report. Pt delivered 5/24/23 @ 2057. Pt still unable to ambulate to bathroom without assistance d/t weakness and numbness in bilateral lower extremities. Arturo Venegas CRNA spoke to pt earlier in the shift regarding the numbness/weakness. Will continue to monitor and have 2 nurse assist when ambulating at this time.

## 2023-05-26 NOTE — PROGRESS NOTES
Rn worked with PT to help stand up on the side of bed to ambulate. The pt was tearful RN encouraged pt to stand at the side and try to bear weight. The pt was able to rock back and forth side to side. RN had the pt sit stand two times to get used to bearing weight. The pt tolerated well but was not able to take steps forward. RN consulted with anethesia and CRNA discussed current situation with pt. RN changed linen, performed reyes care, emptied reyes, and changed pt pad and underwear during that time.

## 2023-05-26 NOTE — CONSULTS
Asked to round on patient s/p delivery via ceserean section with epidural anesthesia. Patient continues to have lower extremity numbness and weakness when standing and attempting to ambulate. On assessment, patient with thigh numbness and tingling, small strip of tingling skin on shin. Good strength with dorsiflexion and plantar flexion, able to raise leg in air x5 seconds against pressure. Discussed possible nerve injury as a result from positioning in stirrups during labor/pushing and that numbness and tingling typically resolves within 2 - 6 weeks. Encouraged patient to continue to ambulate with assistance. Patient otherwise comfortable.

## 2023-05-26 NOTE — CONSULTS
Duramorph Follow-Up Note    2 Days Post-Op sp Procedure(s):   SECTION. /82   Pulse 88   Temp 98.2 °F (36.8 °C) (Oral)   Resp 18   Ht 1.6 m (5' 3\")   Wt 103.4 kg (228 lb)   SpO2 97%   Breastfeeding Unknown   BMI 40.39 kg/m² . Patient is POD-2 S/P intrathecal duramorph. Pain is well controlled  Patient reports no headache, fever, weakness or numbness. Epidural/spinal tap site is clean, dry and intact. Requested to speak with patient. She endorses bilateral leg weakness and numbness. R calf numbness and L anterior thigh numbness to touch. While supine she has 5/5 motor strength in R hip and 4/5 in L hip and 5/5 foot flexion and dorsiflexion bilaterally. Based on the location and distribution of her numbness and weakness she likely has some degree of post partum lumbosacral plexopathy. She stated that she did have an extensive period of \"pushing\" with her legs in a stir up position prior to converting to c section. Instructed the patient that she would likely recover strength and sensation after a few weeks with full resolution around 6-8 weeks. Informed to continue to communicate with her obstetrician and let them know if her symptoms worsen.

## 2023-05-27 VITALS
WEIGHT: 228 LBS | OXYGEN SATURATION: 96 % | HEART RATE: 91 BPM | TEMPERATURE: 97.9 F | HEIGHT: 63 IN | SYSTOLIC BLOOD PRESSURE: 131 MMHG | DIASTOLIC BLOOD PRESSURE: 80 MMHG | RESPIRATION RATE: 16 BRPM | BODY MASS INDEX: 40.4 KG/M2

## 2023-05-27 PROCEDURE — 6370000000 HC RX 637 (ALT 250 FOR IP): Performed by: SPECIALIST

## 2023-05-27 PROCEDURE — 97116 GAIT TRAINING THERAPY: CPT

## 2023-05-27 RX ADMIN — IBUPROFEN 600 MG: 600 TABLET ORAL at 06:12

## 2023-05-27 RX ADMIN — DOCUSATE SODIUM 100 MG: 100 CAPSULE, LIQUID FILLED ORAL at 10:45

## 2023-05-27 RX ADMIN — IBUPROFEN 600 MG: 600 TABLET ORAL at 14:50

## 2023-05-27 NOTE — PLAN OF CARE
Problem: Physical Therapy - Adult  Goal: By Discharge: Performs mobility at highest level of function for planned discharge setting. See evaluation for individualized goals. Description: FUNCTIONAL STATUS PRIOR TO ADMISSION: Patient was independent and active without use of DME.    HOME SUPPORT PRIOR TO ADMISSION: The patient lived with  but did not require assist.    Physical Therapy Goals  Initiated 2023  1. Patient will move from supine to sit and sit to supine , scoot up and down, and roll side to side in bed with independence within 7 day(s). 2.  Patient will transfer from bed to chair and chair to bed with modified independence using the least restrictive device within 7 day(s). 3.  Patient will perform sit to stand with modified independence within 7 day(s). 4.  Patient will ambulate with modified independence for 100 feet with the least restrictive device within 7 day(s). 5.  Patient will ascend/descend 5 stairs with 1 handrail(s) with supervision/set-up within 7 day(s).   2023 1001 by Kenney Martin, PT  Outcome: Progressing   PHYSICAL THERAPY TREATMENT    Patient: Garey Goltz (89 y.o. female)  Date: 2023  Diagnosis: Pregnancy in multigravida [Z34.80] Pregnancy in multigravida  Procedure(s) (LRB):   SECTION (N/A) 2 Days Post-Op  Precautions: Fall Risk                    ASSESSMENT:  Patient continues to benefit from skilled PT services and is slowly progressing towards goals. Patient seen this pm for further gait training and stair training. Patient reports continues weakness and numbness in B Les but reports mild improvements. She is able to complete sit<>stand transfers with CGA and additional time this pm.  Gait remains slow and guarded with mild unsteadiness noted but no overt LOB. However patient is currently not safe on stairs.  She was able to ascend steps with min A of 2 but when descending steps, patient demonstrating significant B LE buckling
Problem: Physical Therapy - Adult  Goal: By Discharge: Performs mobility at highest level of function for planned discharge setting. See evaluation for individualized goals. Description: FUNCTIONAL STATUS PRIOR TO ADMISSION: Patient was independent and active without use of DME.    HOME SUPPORT PRIOR TO ADMISSION: The patient lived with  but did not require assist.    Physical Therapy Goals  Initiated 2023  1. Patient will move from supine to sit and sit to supine , scoot up and down, and roll side to side in bed with independence within 7 day(s). 2.  Patient will transfer from bed to chair and chair to bed with modified independence using the least restrictive device within 7 day(s). 3.  Patient will perform sit to stand with modified independence within 7 day(s). 4.  Patient will ambulate with modified independence for 100 feet with the least restrictive device within 7 day(s). 5.  Patient will ascend/descend 5 stairs with 1 handrail(s) with supervision/set-up within 7 day(s). Outcome: Progressing   PHYSICAL THERAPY EVALUATION    Patient: Katie Spence (61 y.o. female)  Date: 2023  Primary Diagnosis: Pregnancy in multigravida [Z34.80]  Procedure(s) (LRB):   SECTION (N/A) 2 Days Post-Op   Precautions: Fall Risk                    ASSESSMENT :   DEFICITS/IMPAIRMENTS:   The patient is limited by decreased B LE strength, R>>L and impaired sensation with subsequent impaired functional mobility and balance limiting functional independence. Patient demonstrates inability to actively extend R knee with formal testing and L quad demonstrates 4-/5 strength. Patient demonstrates mild proximal instability in standing but no knee buckling is present in static or dynamic standing. Patient is requiring min to mod A for sit to stand transfers demonstrating posterior loss of balance when standing from toilet. Patient able to ambulate 40 feet in room using RW.  Gait is slow, guarded and
Problem: Postpartum  Goal: Experiences normal postpartum course  Description:  Postpartum OB-Pregnancy care plan goal which identifies if the mother is experiencing a normal postpartum course  Outcome: Progressing  Goal: Appropriate maternal -  bonding  Description:  Postpartum OB-Pregnancy care plan goal which identifies if the mother and  are bonding appropriately  Outcome: Progressing  Goal: Establishment of infant feeding pattern  Description:  Postpartum OB-Pregnancy care plan goal which identifies if the mother is establishing a feeding pattern with their   Outcome: Progressing  Goal: Incisions, wounds, or drain sites healing without S/S of infection  Outcome: Progressing     Problem: Pain  Goal: Verbalizes/displays adequate comfort level or baseline comfort level  Outcome: Progressing     Problem: Infection - Adult  Goal: Absence of infection at discharge  Outcome: Progressing  Goal: Absence of infection during hospitalization  Outcome: Progressing  Goal: Absence of fever/infection during anticipated neutropenic period  Outcome: Progressing     Problem: Safety - Adult  Goal: Free from fall injury  Outcome: Progressing     Problem: Skin/Tissue Integrity  Goal: Absence of new skin breakdown  Description: 1. Monitor for areas of redness and/or skin breakdown  2. Assess vascular access sites hourly  3. Every 4-6 hours minimum:  Change oxygen saturation probe site  4. Every 4-6 hours:  If on nasal continuous positive airway pressure, respiratory therapy assess nares and determine need for appliance change or resting period. Outcome: Progressing     Problem: Physical Therapy - Adult  Goal: By Discharge: Performs mobility at highest level of function for planned discharge setting. See evaluation for individualized goals.   Description: FUNCTIONAL STATUS PRIOR TO ADMISSION: Patient was independent and active without use of DME.    HOME SUPPORT PRIOR TO ADMISSION: The patient lived with
with use of RW. Education provided on home safety with use of RW (removal of throw rugs, changes in surface, narrow spaces). Step to step pattern for stairs. BUE support on left railing. Patient continues to have left knee buckling with descending steps. Able to perform with assistance of blocking left knee. Educated patients spouse on how to assist and he demonstrated understanding/safety. Pain Rating:  No verbalization of pain    Activity Tolerance:   Good    After treatment:   Patient left in no apparent distress sitting up in chair, Call bell within reach, and Caregiver / family present    COMMUNICATION/EDUCATION:   The patient's plan of care was discussed with: registered nurse    Patient Education  Education Given To: Patient; Family  Education Provided: Fall Prevention Strategies  Education Method: Demonstration;Verbal;Teach Back  Barriers to Learning: None  Education Outcome: Verbalized understanding;Demonstrated understanding      Darrion Segundo PT, DPT  Minutes: 28

## 2023-05-27 NOTE — PROGRESS NOTES
Patient discharged. Discharge instructions and medications reviewed/given. Patient verbalizes understanding. All questions answered. No distress noted, patient stable. Signed copy of discharge instructions placed on paper chart. Patient confirmed will call to make appointment in 1 week with [facility].

## 2023-05-27 NOTE — PROGRESS NOTES
Post-Operative Day Number 3 Progress/Discharge Note    Patient doing well post-op day 3 from  delivery without significant complaints. Pain controlled on current medication. Voiding without difficulty, normal lochia. Vitals:  Patient Vitals for the past 8 hrs:   BP Temp Temp src Pulse Resp SpO2   23 0254 (!) 142/82 97.8 °F (36.6 °C) Oral 90 16 95 %     Temp (24hrs), Av.1 °F (36.7 °C), Min:97.8 °F (36.6 °C), Max:98.5 °F (36.9 °C)      Vital signs stable, afebrile. Exam:  Patient without distress. Abdomen soft, fundus firm at level of umbilicus, non tender. Incision dry and                      clean without erythema. Lower extremities are negative for swelling, cords or tenderness. Lab/Data Review: All lab results for the last 24 hours reviewed. Assessment and Plan:  Patient appears to be improving with her legs issues, PT recommended further training and will follow up today. Continue current care, possible discharge home if PT agree .

## 2023-05-27 NOTE — DISCHARGE INSTRUCTIONS
Learning About Pinched Nerves  What is it? A pinched nerve (nerve entrapment) is a problem that happens when a nerve is squeezed in a tight space in the body. Nerves can get pinched between bones, tendons, or muscles. This often happens because of an injury, overuse, or aging. Pinched nerves can be painful. What causes it? The cause depends on what nerve is affected. Muscles, bones, tendons, or scar tissue can squeeze nerves. So can swelling, tight shoes or equipment, or an injury. Pinched nerves are also more likely to happen with overuse, staying in one position too long, or having conditions like arthritis. What are the symptoms? The symptoms of a pinched nerve may include pain, tingling or numbness, or weakness. They may be felt in the area of the body served by the nerve. The symptoms can get worse when you move in certain ways. How is it diagnosed? Your doctor will do a physical exam. Depending on where you have symptoms, the exam will include watching how you move, checking your reflexes to see how your nerves are working, and checking for muscle weakness. Your doctor may order tests, such as:  Nerve tests. These tests show how well and how fast the nerves send electrical signals to your muscles. X-rays. Imaging tests, such as an MRI or a CT scan. These tests can show what's putting pressure on a nerve. How is it treated? Treatment for a pinched nerve depends on which nerve is affected. Treatment can include rest, stretching, and anti-inflammatory medicines. It can also include steroid shots and sometimes surgery. Other treatment may include wearing a brace, orthotics, or other types of support for the area. How can you care for yourself at home? Follow your doctor's advice for rest, level of activity, how to protect the area, and stretching. Be safe with medicines. Read and follow all instructions on the label.   If the doctor gave you a prescription medicine for pain, take it as

## 2023-05-28 LAB
ABO + RH BLD: NORMAL
BLOOD GROUP ANTIBODIES SERPL: NORMAL
SPECIMEN EXP DATE BLD: NORMAL

## 2023-06-01 NOTE — DISCHARGE SUMMARY
Obstetrical Discharge Summary     Name: Jenny Fitzpatrick MRN: [id number]  SSN: [id number]    YOB: 1990  Age: 27 y.o. Sex: female      Allergies: Topical sulfur    Admit Date: 2023    Discharge Date: 2023     Admitting Physician: TIMMY Quinn CNM     Attending Physician:  No att. providers found     * Admission Diagnoses: Pregnancy in multigravida [Z34.80]    * Discharge Diagnoses:   Information for the patient's :  Almaz Bravo [[id number]]   @[id number]@      Additional Diagnoses:    Lab Results   Component Value Date/Time    ABORH A POSITIVE 2023 05:32 PM       Immunization(s): There is no immunization history for the selected administration types on file for this patient. * Procedures:   Procedure(s):   SECTION           * Discharge Condition: [facility] Course: Normal hospital course following the delivery. * Disposition: Home    Discharge Medications:      Medication List        START taking these medications      docusate 100 MG Caps  Commonly known as: COLACE, DULCOLAX  Take 100 mg by mouth 2 times daily     ibuprofen 600 MG tablet  Commonly known as: ADVIL;MOTRIN  Take 1 tablet by mouth every 8 hours            CONTINUE taking these medications      [id number] PO            ASK your doctor about these medications      oxyCODONE 5 MG immediate release tablet  Commonly known as: ROXICODONE  Take 1 tablet by mouth every 6 hours as needed for Pain for up to 3 days. Max Daily Amount: 20 mg  Ask about: Should I take this medication? Where to Get Your Medications        Information about where to get these medications is not yet available    Ask your nurse or doctor about these medications  docusate 100 MG Caps  ibuprofen 600 MG tablet  oxyCODONE 5 MG immediate release tablet         * Follow-up Care/Patient Instructions:   Activity: activity as tolerated  Diet: regular diet  Wound Care: keep wound clean and

## (undated) DEVICE — CANISTER, RIGID, 3000CC: Brand: MEDLINE INDUSTRIES, INC.

## (undated) DEVICE — 1LYRTR 16FR10ML 100%SILI SNAP: Brand: MEDLINE INDUSTRIES, INC.

## (undated) DEVICE — SUTURE VCRL SZ 0 L36IN ABSRB UD L40MM CT 1/2 CIR TAPERPOINT J958H

## (undated) DEVICE — TUBING, SUCTION, 1/4" X 10', STRAIGHT: Brand: MEDLINE

## (undated) DEVICE — SOLIDIFIER FLD 3.2OZ 3000CC TRAD IN BTL LIQUI-LOC

## (undated) DEVICE — APPLICATOR MEDICATED 26 CC SOLUTION HI LT ORNG CHLORAPREP

## (undated) DEVICE — PENCIL ES L3M BTTN SWCH S STL HEX LOK BLDE ELECTRD HOLSTER

## (undated) DEVICE — CLEANER ES TIP W2XL2IN ADH BK RADPQ FOR S STL ELECTRD

## (undated) DEVICE — PAD,ABDOMINAL,5"X9",ST,LF,25/BX: Brand: MEDLINE INDUSTRIES, INC.

## (undated) DEVICE — SUTURE VCRL + SZ 1 L27IN ABSRB VLT L36MM CT-1 1/2 CIR VCP341H

## (undated) DEVICE — C-SECTION II-LF: Brand: MEDLINE INDUSTRIES, INC.

## (undated) DEVICE — STAPLER SKIN SQ 30 ABSRB STPL DISP INSORB ORDER VIA PHONE OR EMAIL

## (undated) DEVICE — SPONGE LAPAROTOMY W18XL18IN WHITE STRUNG RADIOPAQUE STERILE

## (undated) DEVICE — SOLUTION IRRIG 1000ML 0.9% SOD CHL USP POUR PLAS BTL

## (undated) DEVICE — ELECTRODE PT RET AD L9FT HI MOIST COND ADH HYDRGEL CORDED

## (undated) DEVICE — C-SECTION MRMC: Brand: MEDLINE INDUSTRIES, INC.